# Patient Record
Sex: MALE | Race: WHITE
[De-identification: names, ages, dates, MRNs, and addresses within clinical notes are randomized per-mention and may not be internally consistent; named-entity substitution may affect disease eponyms.]

---

## 2018-08-29 ENCOUNTER — HOSPITAL ENCOUNTER (OUTPATIENT)
Dept: HOSPITAL 92 - ERS | Age: 63
Setting detail: OBSERVATION
LOS: 1 days | Discharge: HOME | End: 2018-08-30
Attending: INTERNAL MEDICINE | Admitting: INTERNAL MEDICINE
Payer: OTHER GOVERNMENT

## 2018-08-29 ENCOUNTER — HOSPITAL ENCOUNTER (EMERGENCY)
Dept: HOSPITAL 9 - MADERS | Age: 63
Discharge: ANOTHER HOSPITAL | End: 2018-08-29
Payer: OTHER GOVERNMENT

## 2018-08-29 VITALS — BODY MASS INDEX: 31.6 KG/M2

## 2018-08-29 DIAGNOSIS — Z79.899: ICD-10-CM

## 2018-08-29 DIAGNOSIS — Z79.4: ICD-10-CM

## 2018-08-29 DIAGNOSIS — Z95.4: ICD-10-CM

## 2018-08-29 DIAGNOSIS — Z95.5: ICD-10-CM

## 2018-08-29 DIAGNOSIS — F17.210: ICD-10-CM

## 2018-08-29 DIAGNOSIS — Z88.2: ICD-10-CM

## 2018-08-29 DIAGNOSIS — E11.9: ICD-10-CM

## 2018-08-29 DIAGNOSIS — E78.5: ICD-10-CM

## 2018-08-29 DIAGNOSIS — I10: Primary | ICD-10-CM

## 2018-08-29 DIAGNOSIS — I25.10: ICD-10-CM

## 2018-08-29 DIAGNOSIS — R07.89: Primary | ICD-10-CM

## 2018-08-29 DIAGNOSIS — I10: ICD-10-CM

## 2018-08-29 DIAGNOSIS — Z88.0: ICD-10-CM

## 2018-08-29 LAB
ALBUMIN SERPL BCG-MCNC: 4.3 G/DL (ref 3.4–4.8)
ALP SERPL-CCNC: 165 U/L (ref 40–150)
ALT SERPL W P-5'-P-CCNC: 19 U/L (ref 8–55)
ANION GAP SERPL CALC-SCNC: 14 MMOL/L (ref 10–20)
AST SERPL-CCNC: 12 U/L (ref 5–34)
BASOPHILS # BLD AUTO: 0.2 THOU/UL (ref 0–0.2)
BASOPHILS NFR BLD AUTO: 1.9 % (ref 0–1)
BILIRUB SERPL-MCNC: 0.5 MG/DL (ref 0.2–1.2)
BUN SERPL-MCNC: 22 MG/DL (ref 8.4–25.7)
CALCIUM SERPL-MCNC: 9.7 MG/DL (ref 7.8–10.44)
CHLORIDE SERPL-SCNC: 106 MMOL/L (ref 98–107)
CK MB SERPL-MCNC: 1.7 NG/ML (ref 0–6.6)
CK SERPL-CCNC: 112 U/L (ref 30–200)
CO2 SERPL-SCNC: 24 MMOL/L (ref 23–31)
CREAT CL PREDICTED SERPL C-G-VRATE: 0 ML/MIN (ref 70–130)
EOSINOPHIL # BLD AUTO: 0.2 THOU/UL (ref 0–0.7)
EOSINOPHIL NFR BLD AUTO: 1.5 % (ref 0–10)
GLOBULIN SER CALC-MCNC: 2.9 G/DL (ref 2.4–3.5)
GLUCOSE SERPL-MCNC: 233 MG/DL (ref 80–115)
HGB BLD-MCNC: 14.9 G/DL (ref 14–18)
LYMPHOCYTES # BLD AUTO: 3.7 THOU/UL (ref 1.2–3.4)
LYMPHOCYTES NFR BLD AUTO: 34.4 % (ref 21–51)
MCH RBC QN AUTO: 31.3 PG (ref 27–31)
MCV RBC AUTO: 89.4 FL (ref 78–98)
MONOCYTES # BLD AUTO: 0.9 THOU/UL (ref 0.11–0.59)
MONOCYTES NFR BLD AUTO: 8.8 % (ref 0–10)
NEUTROPHILS # BLD AUTO: 5.7 THOU/UL (ref 1.4–6.5)
NEUTROPHILS NFR BLD AUTO: 53.5 % (ref 42–75)
PLATELET # BLD AUTO: 198 THOU/UL (ref 130–400)
POTASSIUM SERPL-SCNC: 4.1 MMOL/L (ref 3.5–5.1)
RBC # BLD AUTO: 4.78 MILL/UL (ref 4.7–6.1)
SODIUM SERPL-SCNC: 140 MMOL/L (ref 136–145)
TROPONIN I SERPL DL<=0.01 NG/ML-MCNC: (no result) NG/ML (ref ?–0.03)
WBC # BLD AUTO: 10.7 THOU/UL (ref 4.8–10.8)

## 2018-08-29 PROCEDURE — 80048 BASIC METABOLIC PNL TOTAL CA: CPT

## 2018-08-29 PROCEDURE — 94760 N-INVAS EAR/PLS OXIMETRY 1: CPT

## 2018-08-29 PROCEDURE — 71045 X-RAY EXAM CHEST 1 VIEW: CPT

## 2018-08-29 PROCEDURE — A9500 TC99M SESTAMIBI: HCPCS

## 2018-08-29 PROCEDURE — 80053 COMPREHEN METABOLIC PANEL: CPT

## 2018-08-29 PROCEDURE — 93005 ELECTROCARDIOGRAM TRACING: CPT

## 2018-08-29 PROCEDURE — 85379 FIBRIN DEGRADATION QUANT: CPT

## 2018-08-29 PROCEDURE — 36416 COLLJ CAPILLARY BLOOD SPEC: CPT

## 2018-08-29 PROCEDURE — G0378 HOSPITAL OBSERVATION PER HR: HCPCS

## 2018-08-29 PROCEDURE — 36415 COLL VENOUS BLD VENIPUNCTURE: CPT

## 2018-08-29 PROCEDURE — 84484 ASSAY OF TROPONIN QUANT: CPT

## 2018-08-29 PROCEDURE — 82553 CREATINE MB FRACTION: CPT

## 2018-08-29 PROCEDURE — 96374 THER/PROPH/DIAG INJ IV PUSH: CPT

## 2018-08-29 PROCEDURE — 85025 COMPLETE CBC W/AUTO DIFF WBC: CPT

## 2018-08-29 PROCEDURE — 80061 LIPID PANEL: CPT

## 2018-08-29 PROCEDURE — 82550 ASSAY OF CK (CPK): CPT

## 2018-08-29 PROCEDURE — 83036 HEMOGLOBIN GLYCOSYLATED A1C: CPT

## 2018-08-29 PROCEDURE — 99406 BEHAV CHNG SMOKING 3-10 MIN: CPT

## 2018-08-29 PROCEDURE — 78452 HT MUSCLE IMAGE SPECT MULT: CPT

## 2018-08-29 PROCEDURE — 96361 HYDRATE IV INFUSION ADD-ON: CPT

## 2018-08-29 PROCEDURE — 83880 ASSAY OF NATRIURETIC PEPTIDE: CPT

## 2018-08-29 PROCEDURE — 93017 CV STRESS TEST TRACING ONLY: CPT

## 2018-08-29 RX ADMIN — INSULIN GLARGINE SCH MLS: 100 INJECTION, SOLUTION SUBCUTANEOUS at 22:09

## 2018-08-29 NOTE — RAD
PORTABLE CHEST:

 

Date: 8-29-18 

 

Provided Clinical History: 

Difficulty breathing. 

 

FINDINGS: 

Comparison 12-15-15. Cardiac and mediastinal silhouette is within normal limits. Vascular calcificati
on involves the aortic arch. No focal consolidation, pleural fluid, or pneumothorax apparent. Remote 
healed right sided rib fractures noted. 

 

IMPRESSION: 

No evidence for an acute cardiopulmonary process. 

 

POS: [facility]

## 2018-08-30 VITALS — TEMPERATURE: 97.7 F

## 2018-08-30 VITALS — DIASTOLIC BLOOD PRESSURE: 74 MMHG | SYSTOLIC BLOOD PRESSURE: 163 MMHG

## 2018-08-30 LAB
ANION GAP SERPL CALC-SCNC: 9 MMOL/L (ref 10–20)
BASOPHILS # BLD AUTO: 0.1 THOU/UL (ref 0–0.2)
BASOPHILS NFR BLD AUTO: 0.8 % (ref 0–1)
BUN SERPL-MCNC: 19 MG/DL (ref 8.4–25.7)
CALCIUM SERPL-MCNC: 8.9 MG/DL (ref 7.8–10.44)
CHD RISK SERPL-RTO: 4.2 (ref ?–4.5)
CHLORIDE SERPL-SCNC: 108 MMOL/L (ref 98–107)
CHOLEST SERPL-MCNC: 177 MG/DL
CO2 SERPL-SCNC: 27 MMOL/L (ref 23–31)
CREAT CL PREDICTED SERPL C-G-VRATE: 128 ML/MIN (ref 70–130)
EOSINOPHIL # BLD AUTO: 0.2 THOU/UL (ref 0–0.7)
EOSINOPHIL NFR BLD AUTO: 1.8 % (ref 0–10)
GLUCOSE SERPL-MCNC: 119 MG/DL (ref 80–115)
HDLC SERPL-MCNC: 42 MG/DL
HGB BLD-MCNC: 13.6 G/DL (ref 14–18)
LDLC SERPL CALC-MCNC: 102 MG/DL
LYMPHOCYTES # BLD: 3.6 THOU/UL (ref 1.2–3.4)
LYMPHOCYTES NFR BLD AUTO: 35.4 % (ref 21–51)
MCH RBC QN AUTO: 32.6 PG (ref 27–31)
MCV RBC AUTO: 91.9 FL (ref 78–98)
MONOCYTES # BLD AUTO: 0.9 THOU/UL (ref 0.11–0.59)
MONOCYTES NFR BLD AUTO: 9.1 % (ref 0–10)
NEUTROPHILS # BLD AUTO: 5.3 THOU/UL (ref 1.4–6.5)
NEUTROPHILS NFR BLD AUTO: 52.9 % (ref 42–75)
PLATELET # BLD AUTO: 173 THOU/UL (ref 130–400)
POTASSIUM SERPL-SCNC: 3.5 MMOL/L (ref 3.5–5.1)
RBC # BLD AUTO: 4.18 MILL/UL (ref 4.7–6.1)
SODIUM SERPL-SCNC: 140 MMOL/L (ref 136–145)
TRIGL SERPL-MCNC: 165 MG/DL (ref ?–150)
TROPONIN I SERPL DL<=0.01 NG/ML-MCNC: (no result) NG/ML (ref ?–0.03)
WBC # BLD AUTO: 10 THOU/UL (ref 4.8–10.8)

## 2018-08-30 RX ADMIN — INSULIN GLARGINE SCH: 100 INJECTION, SOLUTION SUBCUTANEOUS at 13:14

## 2018-08-30 NOTE — PDOC.PN
- Subjective


Encounter Start Date: 08/30/18


Encounter Start Time: 09:45


Subjective: no chest pain now


-: is npo for stress test today





- Objective


Resuscitation Status: 


 











Resuscitation Status           FULL:Full Resuscitation














MAR Reviewed: Yes


Vital Signs & Weight: 


 Vital Signs (12 hours)











  Temp Pulse Resp BP BP Pulse Ox


 


 08/30/18 08:45     163/74 H  


 


 08/30/18 08:25  97.7 F  69  15   141/66 H  95


 


 08/30/18 02:22  97.6 F  80  18  163/74 H   95


 


 08/29/18 23:35  98.0 F  73  18   173/76 H  93 L








 Weight











Weight                         214 lb 1 oz














I&O: 


 











 08/29/18 08/30/18 08/31/18





 06:59 06:59 06:59


 


Intake Total  1040 


 


Balance  1040 











Result Diagrams: 


 08/30/18 05:03





 08/30/18 05:03


Additional Labs: 


 Accuchecks











  08/29/18





  20:20


 


POC Glucose  146 H














Phys Exam





- Physical Examination


HEENT: PERRLA, moist MMs


Neck: no JVD, supple


Respiratory: no wheezing, no rales


Cardiovascular: RRR, no significant murmur


Gastrointestinal: soft, non-tender, positive bowel sounds


Musculoskeletal: no edema, pulses present


Neurological: non-focal, moves all 4 limbs


Psychiatric: normal affect, A&O x 3





Dx/Plan


(1) Chest pain


Code(s): R07.9 - CHEST PAIN, UNSPECIFIED   Status: Acute   


Qualifiers: 


   Chest pain type: unspecified   Qualified Code(s): R07.9 - Chest pain, 

unspecified   





(2) DM type 2 (diabetes mellitus, type 2)


Status: Chronic   


Qualifiers: 


   Diabetes mellitus long term insulin use: with long term use   Diabetes 

mellitus complication status: with unspecified complications   Qualified Code(s)

: E11.8 - Type 2 diabetes mellitus with unspecified complications; Z79.4 - Long 

term (current) use of insulin   





(3) Coronary artery disease


Code(s): I25.10 - ATHSCL HEART DISEASE OF NATIVE CORONARY ARTERY W/O ANG PCTRS 

  Status: Chronic   


Qualifiers: 


   Coronary Disease-Associated Artery/Lesion type: native artery   Native vs. 

transplanted heart: native heart   Associated angina: with stable angina   

Qualified Code(s): I25.118 - Atherosclerotic heart disease of native coronary 

artery with other forms of angina pectoris   


Comment: prior h/o stent x2 to RCA, OMx?1   





(4) Hyperlipidemia


Code(s): E78.5 - HYPERLIPIDEMIA, UNSPECIFIED   Status: Chronic   


Qualifiers: 


   Hyperlipidemia type: unspecified   Qualified Code(s): E78.5 - Hyperlipidemia

, unspecified   





(5) Hypertension


Code(s): I10 - ESSENTIAL (PRIMARY) HYPERTENSION   Status: Chronic   


Qualifiers: 


   Hypertension type: essential hypertension   Qualified Code(s): I10 - 

Essential (primary) hypertension   





(6) Tobacco abuse


Code(s): Z72.0 - TOBACCO USE   Status: Chronic   





- Plan


stress test


-: had echo done 1 week back in 's office


-: likely might have venous insuff with pedal edema off and on


-: on asp, lipitor, norvasc, lisinopril and lantus 


-: cardio consult ?noncompliance with on going tob abuse





* .








Review of Systems





- Medications/Allergies


Allergies/Adverse Reactions: 


 Allergies











Allergy/AdvReac Type Severity Reaction Status Date / Time


 


acetaminophen Allergy Intermediate  Verified 05/21/15 00:10


 


ampicillin Allergy Intermediate Rash Verified 05/21/15 00:10


 


codeine Allergy Intermediate Rash Verified 05/21/15 00:10


 


Penicillins Allergy Intermediate Rash Verified 05/21/15 00:10


 


bee stings Allergy   Uncoded 12/15/15 23:11











Medications: 


 Current Medications





Albuterol/Ipratropium (Duoneb)  3 ml NEB QIDPRN PRN


   PRN Reason: SOB &/or Wheezing


Amlodipine Besylate (Norvasc)  5 mg PO DAILY [facility]


   Last Admin: 08/30/18 08:44 Dose:  5 mg


Aspirin (Aspirin)  325 mg PO DAILY [facility]


   Last Admin: 08/30/18 08:45 Dose:  325 mg


Atorvastatin Calcium (Lipitor)  80 mg PO QPM [facility]


   Last Admin: 08/29/18 22:07 Dose:  80 mg


Dextrose/Water (Dextrose 50%)  25 gm SLOW IVP PRN PRN


   PRN Reason: Hypoglycemia


Enoxaparin Sodium (Lovenox)  40 mg SC 0900 [facility]


   Last Admin: 08/30/18 08:45 Dose:  Not Given


Glucagon (Glucagon)  1 mg IM PRN PRN


   PRN Reason: Hypoglycemia


Dextrose/Water (D5w)  1,000 mls @ 0 mls/hr IV .Q0M PRN


   PRN Reason: Hypoglycemia


Insulin Glargine 35 units/ (Miscellaneous Medication)  0.35 mls @ 0 mls/hr SC 

BID [facility]


   Last Admin: 08/29/18 22:09 Dose:  0.35 mls


Insulin Human Lispro (Humalog)  0 units SC .MILD SLIDING SCALE PRN


   PRN Reason: Mild Correctional Scale


Lisinopril (Zestril)  40 mg PO DAILY [facility]


   Last Admin: 08/30/18 08:45 Dose:  40 mg


Nitroglycerin (Nitrostat)  0.4 mg SL Q5MIN PRN


   PRN Reason: Chest Pain


Ondansetron HCl (Zofran)  4 mg IVP Q6H PRN


   PRN Reason: Nausea/Vomiting


Tamsulosin HCl (Flomax)  0.4 mg PO DAILY [facility]


   Last Admin: 08/30/18 08:46 Dose:  0.4 mg


Tramadol HCl (Ultram)  50 mg PO Q6H PRN


   PRN Reason: Moderate Pain (4-6)


   Last Admin: 08/30/18 08:56 Dose:  50 mg

## 2018-08-30 NOTE — HP
PRIMARY CARE PHYSICIAN:  Zaida Villeda M.D.

 

CARDIOLOGIST:  Kemar Zazueta M.D.

 

CODE STATUS:  FULL CODE.

 

TIME OF EVALUATION:  7:35 p.m.

 

CHIEF COMPLAINT:  Chest pain.

 

HISTORY OF PRESENT ILLNESS:  This is a 63 years old male patient with past medical history of medical
 noncompliance, diabetes type 2, GERD, hyperlipidemia, hypertension, came to the hospital after havin
g chest pain, pain has been ongoing for the past 2-3 months, patient reported having the chest tightn
ess, he has been followed with Dr. Zazueta, and was scheduled to have a stress test in the next coming
 days, but he was feeling not well and called Dr. Zazueta's office and he was advised to come here to 
the hospital.  The pain was moderate, 5/10, no clear triggers, no alleviating factors.

 

REVIEW OF SYSTEMS:  Constitutional:  No fever or chills or generalized weakness.  Respiratory:  No co
ugh or sputum production or shortness of breath.  Cardiovascular:  Chest pain, no palpitations, no sh
ortness of breath.  Gastrointestinal:  No nausea, no vomiting, diarrhea or abdominal pain.  CNS:  No 
dizziness, headache or feeling lightheaded.  Genitourinary:  No burning on urination.  Extremities:  
No leg swelling.  All other systems were reviewed and negative except for the findings mentioned abov
e.

 

PAST MEDICAL HISTORY:  As mentioned in the HPI.

 

PAST SURGICAL HISTORY:  History of hernia repair, rib fractures in last March, stents x3 placed 6 mon
ths ago, dislocation of the right knee.

 

PSYCHIATRIC HISTORY:  No previous psychiatric history.

 

SOCIAL HISTORY:  Smokes on a daily basis, 1 pack of cigarettes per day.  Lives at home.  No drug use.


 

FAMILY HISTORY:  The patient denies any significant family history, mom or dad.

 

KNOWN ALLERGIES:  CODEINE SULFATE, PENICILLINS and TYLENOL.

 

REPORTED MEDICATIONS:  Levemir 50 units 2 times a day, lisinopril 40 mg 1 tablet once a day, amlodipi
ne 10 mg orally once a day.

 

PHYSICAL EXAMINATION:

VITAL SIGNS:  Blood pressure 135/104 with heart rate of 71, respiratory rate was 18, temperature 98.5
, pain 4/10, oxygen saturation 97 on room air.

GENERAL APPEARANCE:  Alert, oriented, not in any acute distress.

HEENT:  Eyes:  Normal conjunctiva.  Moist oral mucosa.  Anicteric.

NECK:  No JVD.

RESPIRATORY:  Bilateral air entry.  No rales, no wheezing.  Symmetric expansion.

CARDIOVASCULAR:  Normal rate, regular rhythm.  No murmurs, no gallop, no edema.

ABDOMEN:  Soft, normal bowel sounds.

MUSCULOSKELETAL:  Baseline range of motion and strength.  No tenderness.

SKIN:  Warm and intact.  No pallor, no rash.  No redness.  Peripheral pulses are present.  Capillary 
refill seems to be intact.

NEUROLOGIC:  Baseline sensory.  No evidence of any new focal weakness.  Baseline speech.  Cranial ner
ves seem to be intact.

PSYCHIATRIC:  The patient is in good mood.  No anxiety, oriented, optimal judgment.

 

EKG was discussed with the performing physician from ER, shows a normal sinus rhythm with a rate of 6
6 with no ectopics.  Conduction is normal.  OR interval 190, QT corrected 389.  Chest x-ray was revie
wed.  The patient had no evidence of acute cardiopulmonary disease.

 

LABORATORY DATA:  Reviewed.  The patient has glucose of 146.  Troponin was negative.  White count 10.
7, hemoglobin 14.9, MCV 89.  Sodium 140, potassium 4.1, carbon dioxide 24, anion gap 14, BUN 22, crea
tinine 0.9, GFR 76, glucose 233.  LFTs were normal.

 

ASSESSMENT AND PLAN:  The patient will be placed in the hospital with the following medical problems:


1.  Chest pain, rule out acute coronary syndrome.  The patient has been going on for a few months ___
__ by Dr. Zazueta to do a stress test, will do in the morning, we will consult Dr. Zazueta for further 
recommendations.  Reconcile home medications.

2.  Uncontrolled diabetes, blood sugar elevated, hyperglycemia, we will reconcile home medications, s
liding scale for optimal control.

3.  Uncontrolled hypertension with systolic blood pressure 146, reconcile home medications, adjust tr
eatment as needed.

4.  Deep venous thrombosis prophylaxis.

5.  Diastolic dysfunction, this is chronic, seen on the previous echo that was reviewed from 08/2014.
  Reconcile home medications, we will restart diuresis.  The patient was _____.  Further adjustment a
nd management as per Dr. Zazueta.

## 2018-08-30 NOTE — NM
NUCLEAR MEDICINE CARDIAC STRESS WITH EJECTION FRACTION:

 

History: Chest pain. 

 

Comparison: Cardiac stress test, 2012. 

 

Technique: Stress and rest imaging after intravenous administration of 28.7 and 9 mCi Technetium 99M 
Sestamibi, respectively.

 

FINDINGS: 

No scar is seen. Adequate left ventricular uptake of radiotracer. 

 

The calculated ejection fraction is 45%. 

 

IMPRESSION: 

1. No evidence of scar or ischemia. 

2. Ejection fraction 45%.

 

POS: [facility]

## 2018-09-01 NOTE — EKG
Test Reason : 

Blood Pressure : ***/*** mmHG

Vent. Rate : 066 BPM     Atrial Rate : 066 BPM

   P-R Int : 190 ms          QRS Dur : 102 ms

    QT Int : 372 ms       P-R-T Axes : 071 094 067 degrees

   QTc Int : 389 ms

 

Normal sinus rhythm

Rightward axis

Borderline ECG

 

Confirmed by GABRIELLE BAIN DO ([id number]),  TAM ADAMS ([id number]) on 9/1/2018 8:23:27 PM

 

Referred By:             Confirmed By:GABRIELLE BAIN DO

## 2018-09-21 ENCOUNTER — HOSPITAL ENCOUNTER (OUTPATIENT)
Dept: HOSPITAL 92 - CCL | Age: 63
Discharge: HOME | End: 2018-09-21
Attending: INTERNAL MEDICINE
Payer: OTHER GOVERNMENT

## 2018-09-21 VITALS — BODY MASS INDEX: 31.3 KG/M2

## 2018-09-21 DIAGNOSIS — E78.00: ICD-10-CM

## 2018-09-21 DIAGNOSIS — Z91.030: ICD-10-CM

## 2018-09-21 DIAGNOSIS — E11.9: ICD-10-CM

## 2018-09-21 DIAGNOSIS — Z88.8: ICD-10-CM

## 2018-09-21 DIAGNOSIS — I25.2: ICD-10-CM

## 2018-09-21 DIAGNOSIS — Z79.4: ICD-10-CM

## 2018-09-21 DIAGNOSIS — I50.9: ICD-10-CM

## 2018-09-21 DIAGNOSIS — Z95.5: ICD-10-CM

## 2018-09-21 DIAGNOSIS — Z79.82: ICD-10-CM

## 2018-09-21 DIAGNOSIS — Z91.038: ICD-10-CM

## 2018-09-21 DIAGNOSIS — Z88.0: ICD-10-CM

## 2018-09-21 DIAGNOSIS — I11.0: ICD-10-CM

## 2018-09-21 DIAGNOSIS — I25.110: Primary | ICD-10-CM

## 2018-09-21 DIAGNOSIS — K21.9: ICD-10-CM

## 2018-09-21 DIAGNOSIS — F17.210: ICD-10-CM

## 2018-09-21 DIAGNOSIS — Z79.899: ICD-10-CM

## 2018-09-21 LAB
ALBUMIN SERPL BCG-MCNC: 4.1 G/DL (ref 3.4–4.8)
ALP SERPL-CCNC: 147 U/L (ref 40–150)
ALT SERPL W P-5'-P-CCNC: 16 U/L (ref 8–55)
ANION GAP SERPL CALC-SCNC: 12 MMOL/L (ref 10–20)
APTT PPP: 26.1 SEC (ref 22.9–36.1)
AST SERPL-CCNC: 13 U/L (ref 5–34)
BASOPHILS # BLD AUTO: 0.1 THOU/UL (ref 0–0.2)
BASOPHILS NFR BLD AUTO: 1.4 % (ref 0–1)
BILIRUB SERPL-MCNC: 0.7 MG/DL (ref 0.2–1.2)
BUN SERPL-MCNC: 24 MG/DL (ref 8.4–25.7)
CALCIUM SERPL-MCNC: 9.4 MG/DL (ref 7.8–10.44)
CHD RISK SERPL-RTO: 4.5 (ref ?–4.5)
CHLORIDE SERPL-SCNC: 108 MMOL/L (ref 98–107)
CHOLEST SERPL-MCNC: 201 MG/DL
CO2 SERPL-SCNC: 23 MMOL/L (ref 23–31)
CREAT CL PREDICTED SERPL C-G-VRATE: 121 ML/MIN (ref 70–130)
EOSINOPHIL # BLD AUTO: 0.2 THOU/UL (ref 0–0.7)
EOSINOPHIL NFR BLD AUTO: 2.4 % (ref 0–10)
GLOBULIN SER CALC-MCNC: 2.9 G/DL (ref 2.4–3.5)
GLUCOSE SERPL-MCNC: 98 MG/DL (ref 80–115)
HDLC SERPL-MCNC: 45 MG/DL
HGB BLD-MCNC: 14.7 G/DL (ref 14–18)
INR PPP: 1
LDLC SERPL CALC-MCNC: 125 MG/DL
LYMPHOCYTES # BLD: 3.5 THOU/UL (ref 1.2–3.4)
LYMPHOCYTES NFR BLD AUTO: 35.6 % (ref 21–51)
MCH RBC QN AUTO: 30.4 PG (ref 27–31)
MCV RBC AUTO: 92.9 FL (ref 78–98)
MONOCYTES # BLD AUTO: 1 THOU/UL (ref 0.11–0.59)
MONOCYTES NFR BLD AUTO: 9.9 % (ref 0–10)
NEUTROPHILS # BLD AUTO: 5 THOU/UL (ref 1.4–6.5)
NEUTROPHILS NFR BLD AUTO: 50.7 % (ref 42–75)
PLATELET # BLD AUTO: 226 THOU/UL (ref 130–400)
POTASSIUM SERPL-SCNC: 3.9 MMOL/L (ref 3.5–5.1)
PROTHROMBIN TIME: 12.8 SEC (ref 12–14.7)
RBC # BLD AUTO: 4.84 MILL/UL (ref 4.7–6.1)
SODIUM SERPL-SCNC: 139 MMOL/L (ref 136–145)
TRIGL SERPL-MCNC: 155 MG/DL (ref ?–150)
WBC # BLD AUTO: 9.8 THOU/UL (ref 4.8–10.8)

## 2018-09-21 PROCEDURE — 4A023N7 MEASUREMENT OF CARDIAC SAMPLING AND PRESSURE, LEFT HEART, PERCUTANEOUS APPROACH: ICD-10-PCS | Performed by: INTERNAL MEDICINE

## 2018-09-21 PROCEDURE — 93458 L HRT ARTERY/VENTRICLE ANGIO: CPT

## 2018-09-21 PROCEDURE — C1769 GUIDE WIRE: HCPCS

## 2018-09-21 PROCEDURE — 36415 COLL VENOUS BLD VENIPUNCTURE: CPT

## 2018-09-21 PROCEDURE — 99152 MOD SED SAME PHYS/QHP 5/>YRS: CPT

## 2018-09-21 PROCEDURE — 80053 COMPREHEN METABOLIC PANEL: CPT

## 2018-09-21 PROCEDURE — 85730 THROMBOPLASTIN TIME PARTIAL: CPT

## 2018-09-21 PROCEDURE — 85025 COMPLETE CBC W/AUTO DIFF WBC: CPT

## 2018-09-21 PROCEDURE — B2111ZZ FLUOROSCOPY OF MULTIPLE CORONARY ARTERIES USING LOW OSMOLAR CONTRAST: ICD-10-PCS | Performed by: INTERNAL MEDICINE

## 2018-09-21 PROCEDURE — 80061 LIPID PANEL: CPT

## 2018-09-21 PROCEDURE — 85610 PROTHROMBIN TIME: CPT

## 2019-02-20 ENCOUNTER — HOSPITAL ENCOUNTER (OUTPATIENT)
Dept: HOSPITAL 92 - BICMRI | Age: 64
Discharge: HOME | End: 2019-02-20
Attending: ORTHOPAEDIC SURGERY
Payer: OTHER GOVERNMENT

## 2019-02-20 DIAGNOSIS — M75.101: Primary | ICD-10-CM

## 2019-02-20 DIAGNOSIS — M19.011: ICD-10-CM

## 2019-02-20 NOTE — MRI
MRI OF THE RIGHT SHOULDER:

 

Date:  02/20/19 

 

PROVIDED CLINICAL HISTORY:   

Right shoulder pain. 

 

FINDINGS:

There is a high grade partial thickness undersurface tear involving the distal supraspinatus tendon f
ibers at the footplate. Superimposed upon this at the region of the distal conjoined tendon is a full
 thickness, partial width tear, commencing about 12 mm proximal to the footplate and associated with 
about 6 mm of retraction. 

 

The components of the rotator cuff appear otherwise intact. The long head biceps tendon appears intac
t and normally located. 

 

The glenoid labrum and glenohumeral articular cartilage are suboptimally evaluated in the absence of 
joint distention, but appear grossly normal. The amount of fluid within the glenohumeral joint is phy
siologic. There is greater than physiologic subacromial/subdeltoid bursal fluid. 

 

Acromioclavicular joint and osteoarthrosis is noted with mild mass effect upon the subjacent supraspi
natus. Rotator cuff muscular volume appears preserved. No focal concerning regional marrow or muscula
r signal abnormality is evident. 

 

IMPRESSION: 

1.  Tears of the supraspinatus and distal conjoined tendons as described above. 

 

2.  Acromioclavicular joint osteoarthritis. 

 

 

POS: [facility]

## 2020-03-16 ENCOUNTER — HOSPITAL ENCOUNTER (EMERGENCY)
Dept: HOSPITAL 9 - MADERS | Age: 65
Discharge: LEFT BEFORE DISCHARGE | End: 2020-03-16
Payer: OTHER GOVERNMENT

## 2020-03-16 DIAGNOSIS — R11.0: ICD-10-CM

## 2020-03-16 DIAGNOSIS — K21.9: ICD-10-CM

## 2020-03-16 DIAGNOSIS — R06.00: ICD-10-CM

## 2020-03-16 DIAGNOSIS — H53.8: ICD-10-CM

## 2020-03-16 DIAGNOSIS — R53.83: ICD-10-CM

## 2020-03-16 DIAGNOSIS — J44.9: ICD-10-CM

## 2020-03-16 DIAGNOSIS — F17.210: ICD-10-CM

## 2020-03-16 DIAGNOSIS — I20.9: ICD-10-CM

## 2020-03-16 DIAGNOSIS — Z79.4: ICD-10-CM

## 2020-03-16 DIAGNOSIS — E78.00: ICD-10-CM

## 2020-03-16 DIAGNOSIS — E11.9: ICD-10-CM

## 2020-03-16 DIAGNOSIS — I11.0: ICD-10-CM

## 2020-03-16 DIAGNOSIS — I25.2: ICD-10-CM

## 2020-03-16 DIAGNOSIS — R07.9: Primary | ICD-10-CM

## 2020-03-16 DIAGNOSIS — I50.9: ICD-10-CM

## 2020-03-16 DIAGNOSIS — E78.5: ICD-10-CM

## 2020-03-16 LAB
ALBUMIN SERPL BCG-MCNC: 3.9 G/DL (ref 3.4–4.8)
ALP SERPL-CCNC: 164 U/L (ref 40–110)
ALT SERPL W P-5'-P-CCNC: 18 U/L (ref 8–55)
ANION GAP SERPL CALC-SCNC: 17 MMOL/L (ref 10–20)
APTT PPP: 26 SEC (ref 22.9–36.1)
AST SERPL-CCNC: 12 U/L (ref 5–34)
BASOPHILS # BLD AUTO: 0.1 THOU/UL (ref 0–0.2)
BASOPHILS NFR BLD AUTO: 1.2 % (ref 0–1)
BILIRUB SERPL-MCNC: 0.4 MG/DL (ref 0.2–1.2)
BUN SERPL-MCNC: 16 MG/DL (ref 8.4–25.7)
CALCIUM SERPL-MCNC: 8.7 MG/DL (ref 7.8–10.44)
CHLORIDE SERPL-SCNC: 107 MMOL/L (ref 98–107)
CO2 SERPL-SCNC: 19 MMOL/L (ref 23–31)
CREAT CL PREDICTED SERPL C-G-VRATE: 0 ML/MIN (ref 70–130)
D DIMER PPP FEU-MCNC: 0.43 *MCG/ML (ref 0.27–0.43)
EOSINOPHIL # BLD AUTO: 0.2 THOU/UL (ref 0–0.7)
EOSINOPHIL NFR BLD AUTO: 1.4 % (ref 0–10)
GLOBULIN SER CALC-MCNC: 2.9 G/DL (ref 2.4–3.5)
GLUCOSE SERPL-MCNC: 245 MG/DL (ref 80–115)
HGB BLD-MCNC: 14.8 G/DL (ref 14–18)
INR PPP: 0.9
LIPASE SERPL-CCNC: 37 U/L (ref 8–78)
LYMPHOCYTES # BLD AUTO: 3.5 THOU/UL (ref 1.2–3.4)
LYMPHOCYTES NFR BLD AUTO: 30.1 % (ref 21–51)
MCH RBC QN AUTO: 29.9 PG (ref 27–31)
MCV RBC AUTO: 90.8 FL (ref 78–98)
MONOCYTES # BLD AUTO: 0.7 THOU/UL (ref 0.11–0.59)
MONOCYTES NFR BLD AUTO: 6 % (ref 0–10)
NEUTROPHILS # BLD AUTO: 7.1 THOU/UL (ref 1.4–6.5)
NEUTROPHILS NFR BLD AUTO: 61.3 % (ref 42–75)
PLATELET # BLD AUTO: 249 THOU/UL (ref 130–400)
POTASSIUM SERPL-SCNC: 4 MMOL/L (ref 3.5–5.1)
PROTHROMBIN TIME: 12.5 SEC (ref 12–14.7)
RBC # BLD AUTO: 4.96 MILL/UL (ref 4.7–6.1)
SODIUM SERPL-SCNC: 139 MMOL/L (ref 136–145)
WBC # BLD AUTO: 11.6 THOU/UL (ref 4.8–10.8)

## 2020-03-16 PROCEDURE — 85730 THROMBOPLASTIN TIME PARTIAL: CPT

## 2020-03-16 PROCEDURE — 93005 ELECTROCARDIOGRAM TRACING: CPT

## 2020-03-16 PROCEDURE — 85610 PROTHROMBIN TIME: CPT

## 2020-03-16 PROCEDURE — 83880 ASSAY OF NATRIURETIC PEPTIDE: CPT

## 2020-03-16 PROCEDURE — 83690 ASSAY OF LIPASE: CPT

## 2020-03-16 PROCEDURE — 70450 CT HEAD/BRAIN W/O DYE: CPT

## 2020-03-16 PROCEDURE — 85025 COMPLETE CBC W/AUTO DIFF WBC: CPT

## 2020-03-16 PROCEDURE — 84484 ASSAY OF TROPONIN QUANT: CPT

## 2020-03-16 PROCEDURE — 80053 COMPREHEN METABOLIC PANEL: CPT

## 2020-03-16 PROCEDURE — 85379 FIBRIN DEGRADATION QUANT: CPT

## 2020-03-16 PROCEDURE — 71045 X-RAY EXAM CHEST 1 VIEW: CPT

## 2020-03-16 NOTE — CT
CT BRAIN WITHOUT CONTRAST:



HISTORY: Vision change, generalized weakness



COMPARISON: 12/16/2015



FINDINGS:

No evidence of acute infarct, hemorrhage, midline shift or abnormal extra-axial fluid collections is 
seen. The ventricular size is appropriate and the basilar cisterns are patent. The bony calvarium

is intact. The visualized paranasal sinuses and mastoid air cells are well aerated.



IMPRESSION: No CT evidence of acute intracranial process.



Reported By: Vic Velez 

Electronically Signed:  3/16/2020 3:24 PM

## 2020-03-16 NOTE — RAD
Chest one view



HISTORY: Dyspnea. Chest pain.



COMPARISON: 8/29/2018.



FINDINGS: Cardiac silhouette is magnified by projection. Pulmonary vasculature is unremarkable. Media
stinum is midline. No confluent airspace consolidation or evidence of pneumothorax. Old right

posterolateral upper to mid rib fractures again demonstrated.



Cardiac monitor leads overlie the chest.







IMPRESSION: No active cardiopulmonary abnormalities are demonstrated.



Reported By: VERN Helton 

Electronically Signed:  3/16/2020 3:06 PM

## 2021-03-05 ENCOUNTER — HOSPITAL ENCOUNTER (OUTPATIENT)
Dept: HOSPITAL 9 - MADRAD | Age: 66
Discharge: HOME | End: 2021-03-05
Attending: NURSE PRACTITIONER
Payer: OTHER GOVERNMENT

## 2021-03-05 DIAGNOSIS — M19.072: ICD-10-CM

## 2021-03-05 DIAGNOSIS — M79.672: ICD-10-CM

## 2021-03-05 DIAGNOSIS — S90.32XA: Primary | ICD-10-CM

## 2021-05-01 ENCOUNTER — HOSPITAL ENCOUNTER (OUTPATIENT)
Dept: HOSPITAL 92 - ERS | Age: 66
Setting detail: OBSERVATION
Discharge: HOME | End: 2021-05-01
Attending: STUDENT IN AN ORGANIZED HEALTH CARE EDUCATION/TRAINING PROGRAM | Admitting: STUDENT IN AN ORGANIZED HEALTH CARE EDUCATION/TRAINING PROGRAM
Payer: OTHER GOVERNMENT

## 2021-05-01 ENCOUNTER — HOSPITAL ENCOUNTER (EMERGENCY)
Dept: HOSPITAL 9 - MADERS | Age: 66
Discharge: ANOTHER HOSPITAL | End: 2021-05-01
Payer: OTHER GOVERNMENT

## 2021-05-01 VITALS — SYSTOLIC BLOOD PRESSURE: 135 MMHG | TEMPERATURE: 97.8 F | DIASTOLIC BLOOD PRESSURE: 65 MMHG

## 2021-05-01 VITALS — BODY MASS INDEX: 32.5 KG/M2

## 2021-05-01 DIAGNOSIS — I25.10: ICD-10-CM

## 2021-05-01 DIAGNOSIS — E78.5: ICD-10-CM

## 2021-05-01 DIAGNOSIS — F17.210: ICD-10-CM

## 2021-05-01 DIAGNOSIS — Z79.4: ICD-10-CM

## 2021-05-01 DIAGNOSIS — E11.65: ICD-10-CM

## 2021-05-01 DIAGNOSIS — I50.9: ICD-10-CM

## 2021-05-01 DIAGNOSIS — Z79.82: ICD-10-CM

## 2021-05-01 DIAGNOSIS — J44.9: ICD-10-CM

## 2021-05-01 DIAGNOSIS — E11.9: ICD-10-CM

## 2021-05-01 DIAGNOSIS — Z79.899: ICD-10-CM

## 2021-05-01 DIAGNOSIS — E66.9: ICD-10-CM

## 2021-05-01 DIAGNOSIS — Z88.0: ICD-10-CM

## 2021-05-01 DIAGNOSIS — K21.9: ICD-10-CM

## 2021-05-01 DIAGNOSIS — I25.119: ICD-10-CM

## 2021-05-01 DIAGNOSIS — E78.00: ICD-10-CM

## 2021-05-01 DIAGNOSIS — I11.0: ICD-10-CM

## 2021-05-01 DIAGNOSIS — Z88.5: ICD-10-CM

## 2021-05-01 DIAGNOSIS — Z91.030: ICD-10-CM

## 2021-05-01 DIAGNOSIS — R07.89: Primary | ICD-10-CM

## 2021-05-01 DIAGNOSIS — Z71.6: ICD-10-CM

## 2021-05-01 DIAGNOSIS — Z88.6: ICD-10-CM

## 2021-05-01 DIAGNOSIS — R07.9: Primary | ICD-10-CM

## 2021-05-01 LAB
ALBUMIN SERPL BCG-MCNC: 3.9 G/DL (ref 3.4–4.8)
ALP SERPL-CCNC: 160 U/L (ref 40–110)
ALT SERPL W P-5'-P-CCNC: 20 U/L (ref 8–55)
ANION GAP SERPL CALC-SCNC: 17 MMOL/L (ref 10–20)
APTT PPP: 25.5 SEC (ref 22.9–36.1)
AST SERPL-CCNC: 15 U/L (ref 5–34)
BILIRUB SERPL-MCNC: 0.5 MG/DL (ref 0.2–1.2)
BUN SERPL-MCNC: 19 MG/DL (ref 8.4–25.7)
CALCIUM SERPL-MCNC: 8.9 MG/DL (ref 7.8–10.44)
CHLORIDE SERPL-SCNC: 105 MMOL/L (ref 98–107)
CO2 SERPL-SCNC: 21 MMOL/L (ref 23–31)
CREAT CL PREDICTED SERPL C-G-VRATE: 0 ML/MIN (ref 70–130)
GLOBULIN SER CALC-MCNC: 3.3 G/DL (ref 2.4–3.5)
GLUCOSE SERPL-MCNC: 320 MG/DL (ref 80–115)
HGB BLD-MCNC: 14.7 G/DL (ref 14–18)
INR PPP: 0.9
MCH RBC QN AUTO: 30.4 PG (ref 27–31)
MCV RBC AUTO: 93 FL (ref 78–98)
MDIFF COMPLETE?: YES
PLATELET # BLD AUTO: 236 THOU/UL (ref 130–400)
POTASSIUM SERPL-SCNC: 4 MMOL/L (ref 3.5–5.1)
PROTHROMBIN TIME: 12 SEC (ref 12–14.7)
RBC # BLD AUTO: 4.82 MILL/UL (ref 4.7–6.1)
SODIUM SERPL-SCNC: 139 MMOL/L (ref 136–145)
TROPONIN I SERPL DL<=0.01 NG/ML-MCNC: (no result) NG/ML (ref ?–0.03)
TROPONIN I SERPL DL<=0.01 NG/ML-MCNC: 0.01 NG/ML (ref ?–0.03)
WBC # BLD AUTO: 11.9 THOU/UL (ref 4.8–10.8)

## 2021-05-01 PROCEDURE — G0378 HOSPITAL OBSERVATION PER HR: HCPCS

## 2021-05-01 PROCEDURE — 85610 PROTHROMBIN TIME: CPT

## 2021-05-01 PROCEDURE — 36415 COLL VENOUS BLD VENIPUNCTURE: CPT

## 2021-05-01 PROCEDURE — 71045 X-RAY EXAM CHEST 1 VIEW: CPT

## 2021-05-01 PROCEDURE — 36416 COLLJ CAPILLARY BLOOD SPEC: CPT

## 2021-05-01 PROCEDURE — 99406 BEHAV CHNG SMOKING 3-10 MIN: CPT

## 2021-05-01 PROCEDURE — 80053 COMPREHEN METABOLIC PANEL: CPT

## 2021-05-01 PROCEDURE — 83880 ASSAY OF NATRIURETIC PEPTIDE: CPT

## 2021-05-01 PROCEDURE — 96374 THER/PROPH/DIAG INJ IV PUSH: CPT

## 2021-05-01 PROCEDURE — 74174 CTA ABD&PLVS W/CONTRAST: CPT

## 2021-05-01 PROCEDURE — 93017 CV STRESS TEST TRACING ONLY: CPT

## 2021-05-01 PROCEDURE — 71275 CT ANGIOGRAPHY CHEST: CPT

## 2021-05-01 PROCEDURE — 85730 THROMBOPLASTIN TIME PARTIAL: CPT

## 2021-05-01 PROCEDURE — A9500 TC99M SESTAMIBI: HCPCS

## 2021-05-01 PROCEDURE — 99285 EMERGENCY DEPT VISIT HI MDM: CPT

## 2021-05-01 PROCEDURE — 93005 ELECTROCARDIOGRAM TRACING: CPT

## 2021-05-01 PROCEDURE — 84484 ASSAY OF TROPONIN QUANT: CPT

## 2021-05-01 PROCEDURE — 78452 HT MUSCLE IMAGE SPECT MULT: CPT

## 2021-05-01 PROCEDURE — 96376 TX/PRO/DX INJ SAME DRUG ADON: CPT

## 2021-05-01 PROCEDURE — 85025 COMPLETE CBC W/AUTO DIFF WBC: CPT

## 2021-12-14 ENCOUNTER — HOSPITAL ENCOUNTER (OUTPATIENT)
Dept: HOSPITAL 92 - SCSMRI | Age: 66
Discharge: HOME | End: 2021-12-14
Payer: OTHER GOVERNMENT

## 2021-12-14 DIAGNOSIS — M50.122: ICD-10-CM

## 2021-12-14 DIAGNOSIS — M47.22: Primary | ICD-10-CM

## 2021-12-14 DIAGNOSIS — M48.02: ICD-10-CM

## 2021-12-14 PROCEDURE — 72141 MRI NECK SPINE W/O DYE: CPT

## 2022-01-01 ENCOUNTER — HOSPITAL ENCOUNTER (EMERGENCY)
Dept: HOSPITAL 9 - MADERS | Age: 67
Discharge: HOME | End: 2022-01-01
Payer: OTHER GOVERNMENT

## 2022-01-01 DIAGNOSIS — I25.10: ICD-10-CM

## 2022-01-01 DIAGNOSIS — I25.2: ICD-10-CM

## 2022-01-01 DIAGNOSIS — E78.00: ICD-10-CM

## 2022-01-01 DIAGNOSIS — M54.12: ICD-10-CM

## 2022-01-01 DIAGNOSIS — S39.011A: Primary | ICD-10-CM

## 2022-01-01 DIAGNOSIS — K21.9: ICD-10-CM

## 2022-01-01 DIAGNOSIS — I50.9: ICD-10-CM

## 2022-01-01 DIAGNOSIS — E78.5: ICD-10-CM

## 2022-01-01 DIAGNOSIS — Z79.899: ICD-10-CM

## 2022-01-01 DIAGNOSIS — Z79.4: ICD-10-CM

## 2022-01-01 DIAGNOSIS — I11.0: ICD-10-CM

## 2022-01-01 DIAGNOSIS — E11.9: ICD-10-CM

## 2022-01-01 DIAGNOSIS — J44.9: ICD-10-CM

## 2022-01-01 DIAGNOSIS — F17.210: ICD-10-CM
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
## 2022-01-01 LAB
ALBUMIN SERPL BCG-MCNC: 3.7 G/DL (ref 3.4–4.8)
ALP SERPL-CCNC: 136 U/L (ref 40–110)
ALT SERPL W P-5'-P-CCNC: 16 U/L (ref 8–55)
AMYLASE SERPL-CCNC: 62 U/L (ref 25–125)
ANION GAP SERPL CALC-SCNC: 14 MMOL/L (ref 10–20)
AST SERPL-CCNC: 13 U/L (ref 5–34)
BACTERIA UR QL AUTO: (no result) HPF
BASOPHILS # BLD AUTO: 0.2 THOU/UL (ref 0–0.2)
BASOPHILS NFR BLD AUTO: 1.7 % (ref 0–1)
BILIRUB SERPL-MCNC: 0.5 MG/DL (ref 0.2–1.2)
BUN SERPL-MCNC: 26 MG/DL (ref 8.4–25.7)
CALCIUM SERPL-MCNC: 9.6 MG/DL (ref 7.8–10.44)
CHLORIDE SERPL-SCNC: 108 MMOL/L (ref 98–107)
CK SERPL-CCNC: 206 U/L (ref 30–200)
CO2 SERPL-SCNC: 24 MMOL/L (ref 23–31)
CREAT CL PREDICTED SERPL C-G-VRATE: 0 ML/MIN (ref 70–130)
CRP SERPL-MCNC: 0.63 MG/DL
EOSINOPHIL # BLD AUTO: 0.1 THOU/UL (ref 0–0.7)
EOSINOPHIL NFR BLD AUTO: 1.3 % (ref 0–10)
GLOBULIN SER CALC-MCNC: 3 G/DL (ref 2.4–3.5)
GLUCOSE SERPL-MCNC: 249 MG/DL (ref 80–115)
GLUCOSE UR STRIP-MCNC: 100 MG/DL
HGB BLD-MCNC: 13.3 G/DL (ref 14–18)
LIPASE SERPL-CCNC: 42 U/L (ref 8–78)
LYMPHOCYTES # BLD AUTO: 3.1 THOU/UL (ref 1.2–3.4)
LYMPHOCYTES NFR BLD AUTO: 27.6 % (ref 21–51)
MCH RBC QN AUTO: 31 PG (ref 27–31)
MCV RBC AUTO: 90.1 FL (ref 78–98)
MONOCYTES # BLD AUTO: 0.9 THOU/UL (ref 0.11–0.59)
MONOCYTES NFR BLD AUTO: 8.2 % (ref 0–10)
NEUTROPHILS # BLD AUTO: 6.9 THOU/UL (ref 1.4–6.5)
NEUTROPHILS NFR BLD AUTO: 61.2 % (ref 42–75)
PLATELET # BLD AUTO: 257 THOU/UL (ref 130–400)
POTASSIUM SERPL-SCNC: 3.8 MMOL/L (ref 3.5–5.1)
PROT UR STRIP.AUTO-MCNC: (no result) MG/DL
RBC # BLD AUTO: 4.29 MILL/UL (ref 4.7–6.1)
RBC UR QL AUTO: (no result) HPF (ref 0–3)
SODIUM SERPL-SCNC: 142 MMOL/L (ref 136–145)
SP GR UR STRIP: 1.02 (ref 1–1.03)
WBC # BLD AUTO: 11.3 THOU/UL (ref 4.8–10.8)

## 2022-01-01 PROCEDURE — 83880 ASSAY OF NATRIURETIC PEPTIDE: CPT

## 2022-01-01 PROCEDURE — 85379 FIBRIN DEGRADATION QUANT: CPT

## 2022-01-01 PROCEDURE — 74177 CT ABD & PELVIS W/CONTRAST: CPT

## 2022-01-01 PROCEDURE — 86140 C-REACTIVE PROTEIN: CPT

## 2022-01-01 PROCEDURE — 83690 ASSAY OF LIPASE: CPT

## 2022-01-01 PROCEDURE — 71045 X-RAY EXAM CHEST 1 VIEW: CPT

## 2022-01-01 PROCEDURE — 80053 COMPREHEN METABOLIC PANEL: CPT

## 2022-01-01 PROCEDURE — 82550 ASSAY OF CK (CPK): CPT

## 2022-01-01 PROCEDURE — 81003 URINALYSIS AUTO W/O SCOPE: CPT

## 2022-01-01 PROCEDURE — 96375 TX/PRO/DX INJ NEW DRUG ADDON: CPT

## 2022-01-01 PROCEDURE — 71275 CT ANGIOGRAPHY CHEST: CPT

## 2022-01-01 PROCEDURE — 96374 THER/PROPH/DIAG INJ IV PUSH: CPT

## 2022-01-01 PROCEDURE — 82150 ASSAY OF AMYLASE: CPT

## 2022-01-01 PROCEDURE — 85025 COMPLETE CBC W/AUTO DIFF WBC: CPT

## 2022-01-01 PROCEDURE — 93005 ELECTROCARDIOGRAM TRACING: CPT

## 2022-01-01 PROCEDURE — 84484 ASSAY OF TROPONIN QUANT: CPT

## 2022-01-01 PROCEDURE — 81015 MICROSCOPIC EXAM OF URINE: CPT

## 2022-04-25 NOTE — DIS
DATE OF ADMISSION:  08/29/2018

 

DATE OF DISCHARGE:  08/30/2018

 

DISCHARGE DISPOSITION:  To home.

 

PRIMARY DISCHARGE DIAGNOSIS:  Chest pain off and on, is noncardiac.

 

SECONDARY DISCHARGE DIAGNOSES:  Prior history of coronary artery disease with stenting; diabetes kathe
itus, type 2; hypertension; dyslipidemia; tobacco abuse.

 

PROCEDURES DONE DURING HOSPITALIZATION:  The patient had chest x-ray done which showed no acute cardi
opulmonary abnormalities.  Nuclear stress test done showed no reversible ischemia or scar.  H&H 13 an
d 38, platelet count is 173.  Three sets of troponin are negative.  CK-MB is 1.7.  BNP was 28, BUN 22
, creatinine 0.9.  Hemoglobin A1c 8.2, total cholesterol 177, triglycerides 165, , HDL 42.

 

DISCHARGE MEDICATIONS:  Levemir 50 units subcu twice daily, lisinopril 40 mg p.o. daily, Flomax 0.4 m
g p.o. daily, Norvasc 5 mg p.o. daily, aspirin 325 mg p.o. daily, Combivent inhaler 2 puffs 4 times d
aily p.r.n.

 

ALLERGIES:  Allergic to TYLENOL, AMPICILLIN, CODEINE, PENICILLIN, and BEE STING.

 

DISCHARGE PLAN:  The patient to follow up with primary care physician in 1 week and Dr. Zazueta as adv
ised.

 

BRIEF COURSE DURING HOSPITALIZATION:  The patient initially got admitted on 08/29/2018 with complaint
s of off and on chest pain.  He was essentially placed under observation on telemetry in view of stro
ng history of coronary artery disease with prior stent.  Three sets of troponin were negative.  The p
atjuan has had nuclear stress test done which showed no scar or reversible ischemia.  I have discusse
d his findings with Dr. Zazueta and has been cleared for discharge now.  Please see a face-to-face doc
umalvaro on [facility] for the day of discharge.
25-Apr-2022 21:54

## 2022-07-13 ENCOUNTER — HOSPITAL ENCOUNTER (EMERGENCY)
Dept: HOSPITAL 9 - MADERS | Age: 67
Discharge: LEFT BEFORE DISCHARGE | End: 2022-07-13
Payer: OTHER GOVERNMENT

## 2022-07-13 DIAGNOSIS — I25.2: ICD-10-CM

## 2022-07-13 DIAGNOSIS — E11.9: ICD-10-CM

## 2022-07-13 DIAGNOSIS — Z79.899: ICD-10-CM

## 2022-07-13 DIAGNOSIS — J44.9: ICD-10-CM

## 2022-07-13 DIAGNOSIS — I11.0: ICD-10-CM

## 2022-07-13 DIAGNOSIS — I50.9: ICD-10-CM

## 2022-07-13 DIAGNOSIS — E78.00: ICD-10-CM

## 2022-07-13 DIAGNOSIS — F17.210: ICD-10-CM

## 2022-07-13 DIAGNOSIS — R27.0: ICD-10-CM

## 2022-07-13 DIAGNOSIS — I25.110: Primary | ICD-10-CM

## 2022-07-13 LAB
ALBUMIN SERPL BCG-MCNC: 4 G/DL (ref 3.4–4.8)
ALP SERPL-CCNC: 156 U/L (ref 40–110)
ALT SERPL W P-5'-P-CCNC: 19 U/L (ref 8–55)
ANION GAP SERPL CALC-SCNC: 16 MMOL/L (ref 10–20)
AST SERPL-CCNC: 14 U/L (ref 5–34)
BILIRUB SERPL-MCNC: 0.5 MG/DL (ref 0.2–1.2)
BUN SERPL-MCNC: 17 MG/DL (ref 8.4–25.7)
CALCIUM SERPL-MCNC: 8.9 MG/DL (ref 7.8–10.44)
CHLORIDE SERPL-SCNC: 108 MMOL/L (ref 98–107)
CO2 SERPL-SCNC: 21 MMOL/L (ref 23–31)
CREAT CL PREDICTED SERPL C-G-VRATE: 0 ML/MIN (ref 70–130)
GLOBULIN SER CALC-MCNC: 2.6 G/DL (ref 2.4–3.5)
GLUCOSE SERPL-MCNC: 193 MG/DL (ref 80–115)
HGB BLD-MCNC: 13.9 G/DL (ref 14–18)
LIPASE SERPL-CCNC: 64 U/L (ref 8–78)
MCH RBC QN AUTO: 29.9 PG (ref 27–31)
MCV RBC AUTO: 88.4 FL (ref 78–98)
MDIFF COMPLETE?: YES
PLATELET # BLD AUTO: 236 THOU/UL (ref 130–400)
POTASSIUM SERPL-SCNC: 4.2 MMOL/L (ref 3.5–5.1)
RBC # BLD AUTO: 4.66 MILL/UL (ref 4.7–6.1)
SODIUM SERPL-SCNC: 141 MMOL/L (ref 136–145)
WBC # BLD AUTO: 10.7 THOU/UL (ref 4.8–10.8)

## 2022-07-13 PROCEDURE — 70450 CT HEAD/BRAIN W/O DYE: CPT

## 2022-07-13 PROCEDURE — 36416 COLLJ CAPILLARY BLOOD SPEC: CPT

## 2022-07-13 PROCEDURE — 80053 COMPREHEN METABOLIC PANEL: CPT

## 2022-07-13 PROCEDURE — 74177 CT ABD & PELVIS W/CONTRAST: CPT

## 2022-07-13 PROCEDURE — 84484 ASSAY OF TROPONIN QUANT: CPT

## 2022-07-13 PROCEDURE — 85025 COMPLETE CBC W/AUTO DIFF WBC: CPT

## 2022-07-13 PROCEDURE — 93005 ELECTROCARDIOGRAM TRACING: CPT

## 2022-07-13 PROCEDURE — 85379 FIBRIN DEGRADATION QUANT: CPT

## 2022-07-13 PROCEDURE — 71045 X-RAY EXAM CHEST 1 VIEW: CPT

## 2022-07-13 PROCEDURE — 83690 ASSAY OF LIPASE: CPT

## 2022-07-13 PROCEDURE — 96375 TX/PRO/DX INJ NEW DRUG ADDON: CPT

## 2022-07-13 PROCEDURE — 71275 CT ANGIOGRAPHY CHEST: CPT

## 2022-07-13 PROCEDURE — 96376 TX/PRO/DX INJ SAME DRUG ADON: CPT

## 2022-07-13 PROCEDURE — 94760 N-INVAS EAR/PLS OXIMETRY 1: CPT

## 2022-07-13 PROCEDURE — 83880 ASSAY OF NATRIURETIC PEPTIDE: CPT

## 2022-07-13 PROCEDURE — 96374 THER/PROPH/DIAG INJ IV PUSH: CPT

## 2023-05-04 ENCOUNTER — HOSPITAL ENCOUNTER (OUTPATIENT)
Dept: HOSPITAL 92 - 2SW | Age: 68
Setting detail: OBSERVATION
LOS: 1 days | Discharge: HOME | End: 2023-05-05
Attending: FAMILY MEDICINE | Admitting: FAMILY MEDICINE
Payer: OTHER GOVERNMENT

## 2023-05-04 ENCOUNTER — HOSPITAL ENCOUNTER (EMERGENCY)
Dept: HOSPITAL 9 - MADERS | Age: 68
Discharge: ANOTHER HOSPITAL | End: 2023-05-04
Payer: MEDICARE

## 2023-05-04 VITALS — BODY MASS INDEX: 31.2 KG/M2

## 2023-05-04 DIAGNOSIS — Z79.4: ICD-10-CM

## 2023-05-04 DIAGNOSIS — F17.210: ICD-10-CM

## 2023-05-04 DIAGNOSIS — I50.30: ICD-10-CM

## 2023-05-04 DIAGNOSIS — F41.9: Primary | ICD-10-CM

## 2023-05-04 DIAGNOSIS — Z79.899: ICD-10-CM

## 2023-05-04 DIAGNOSIS — E11.9: ICD-10-CM

## 2023-05-04 DIAGNOSIS — R60.0: ICD-10-CM

## 2023-05-04 DIAGNOSIS — I08.1: ICD-10-CM

## 2023-05-04 DIAGNOSIS — I11.0: ICD-10-CM

## 2023-05-04 DIAGNOSIS — R07.89: ICD-10-CM

## 2023-05-04 DIAGNOSIS — E78.00: ICD-10-CM

## 2023-05-04 DIAGNOSIS — Z88.5: ICD-10-CM

## 2023-05-04 DIAGNOSIS — J44.9: ICD-10-CM

## 2023-05-04 DIAGNOSIS — F43.9: ICD-10-CM

## 2023-05-04 DIAGNOSIS — I25.10: ICD-10-CM

## 2023-05-04 DIAGNOSIS — I50.9: ICD-10-CM

## 2023-05-04 DIAGNOSIS — E78.5: ICD-10-CM

## 2023-05-04 DIAGNOSIS — Z95.5: ICD-10-CM

## 2023-05-04 DIAGNOSIS — I44.0: ICD-10-CM

## 2023-05-04 DIAGNOSIS — Z88.6: ICD-10-CM

## 2023-05-04 DIAGNOSIS — Z91.030: ICD-10-CM

## 2023-05-04 DIAGNOSIS — I25.2: ICD-10-CM

## 2023-05-04 DIAGNOSIS — I25.110: ICD-10-CM

## 2023-05-04 DIAGNOSIS — Z88.0: ICD-10-CM

## 2023-05-04 DIAGNOSIS — Z79.82: ICD-10-CM

## 2023-05-04 DIAGNOSIS — R80.9: ICD-10-CM

## 2023-05-04 DIAGNOSIS — R07.2: Primary | ICD-10-CM

## 2023-05-04 LAB
ALBUMIN SERPL BCG-MCNC: 3.8 G/DL (ref 3.4–4.8)
ALP SERPL-CCNC: 134 U/L (ref 40–110)
ALT SERPL W P-5'-P-CCNC: 17 U/L (ref 8–55)
ANION GAP SERPL CALC-SCNC: 15 MMOL/L (ref 10–20)
AST SERPL-CCNC: 11 U/L (ref 5–34)
BACTERIA UR QL AUTO: (no result) HPF
BASOPHILS # BLD AUTO: 0.2 THOU/UL (ref 0–0.2)
BASOPHILS NFR BLD AUTO: 1.4 % (ref 0–1)
BILIRUB SERPL-MCNC: 0.3 MG/DL (ref 0.2–1.2)
BUN SERPL-MCNC: 18 MG/DL (ref 8.4–25.7)
CALCIUM SERPL-MCNC: 9.4 MG/DL (ref 7.8–10.44)
CHLORIDE SERPL-SCNC: 107 MMOL/L (ref 98–107)
CO2 SERPL-SCNC: 22 MMOL/L (ref 23–31)
CREAT CL PREDICTED SERPL C-G-VRATE: 0 ML/MIN (ref 70–130)
EOSINOPHIL # BLD AUTO: 0.2 THOU/UL (ref 0–0.7)
EOSINOPHIL NFR BLD AUTO: 1.5 % (ref 0–10)
GLOBULIN SER CALC-MCNC: 3 G/DL (ref 2.4–3.5)
GLUCOSE SERPL-MCNC: 170 MG/DL (ref 80–115)
HGB BLD-MCNC: 13.7 G/DL (ref 14–18)
LIPASE SERPL-CCNC: 19 U/L (ref 8–78)
LYMPHOCYTES # BLD AUTO: 3.1 THOU/UL (ref 1.2–3.4)
LYMPHOCYTES NFR BLD AUTO: 28.1 % (ref 21–51)
MCH RBC QN AUTO: 31.3 PG (ref 27–31)
MCV RBC AUTO: 91.6 FL (ref 78–98)
MONOCYTES # BLD AUTO: 0.7 THOU/UL (ref 0.11–0.59)
MONOCYTES NFR BLD AUTO: 6.8 % (ref 0–10)
NEUTROPHILS # BLD AUTO: 6.8 THOU/UL (ref 1.4–6.5)
NEUTROPHILS NFR BLD AUTO: 62.2 % (ref 42–75)
PLATELET # BLD AUTO: 226 10X3/UL (ref 130–400)
POTASSIUM SERPL-SCNC: 3.7 MMOL/L (ref 3.5–5.1)
PROT UR STRIP.AUTO-MCNC: (no result) MG/DL
RBC # BLD AUTO: 4.39 MILL/UL (ref 4.7–6.1)
RBC UR QL AUTO: (no result) HPF (ref 0–3)
SODIUM SERPL-SCNC: 140 MMOL/L (ref 136–145)
SP GR UR STRIP: 1.02 (ref 1–1.03)
WBC # BLD AUTO: 10.9 10X3/UL (ref 4.8–10.8)
WBC UR QL AUTO: (no result) HPF (ref 0–3)

## 2023-05-04 PROCEDURE — 93005 ELECTROCARDIOGRAM TRACING: CPT

## 2023-05-04 PROCEDURE — 85379 FIBRIN DEGRADATION QUANT: CPT

## 2023-05-04 PROCEDURE — 36415 COLL VENOUS BLD VENIPUNCTURE: CPT

## 2023-05-04 PROCEDURE — 83880 ASSAY OF NATRIURETIC PEPTIDE: CPT

## 2023-05-04 PROCEDURE — 71046 X-RAY EXAM CHEST 2 VIEWS: CPT

## 2023-05-04 PROCEDURE — G0378 HOSPITAL OBSERVATION PER HR: HCPCS

## 2023-05-04 PROCEDURE — 82570 ASSAY OF URINE CREATININE: CPT

## 2023-05-04 PROCEDURE — 83690 ASSAY OF LIPASE: CPT

## 2023-05-04 PROCEDURE — 96375 TX/PRO/DX INJ NEW DRUG ADDON: CPT

## 2023-05-04 PROCEDURE — 36416 COLLJ CAPILLARY BLOOD SPEC: CPT

## 2023-05-04 PROCEDURE — 96374 THER/PROPH/DIAG INJ IV PUSH: CPT

## 2023-05-04 PROCEDURE — 83036 HEMOGLOBIN GLYCOSYLATED A1C: CPT

## 2023-05-04 PROCEDURE — 80053 COMPREHEN METABOLIC PANEL: CPT

## 2023-05-04 PROCEDURE — 83735 ASSAY OF MAGNESIUM: CPT

## 2023-05-04 PROCEDURE — 81015 MICROSCOPIC EXAM OF URINE: CPT

## 2023-05-04 PROCEDURE — 85025 COMPLETE CBC W/AUTO DIFF WBC: CPT

## 2023-05-04 PROCEDURE — 84443 ASSAY THYROID STIM HORMONE: CPT

## 2023-05-04 PROCEDURE — 84100 ASSAY OF PHOSPHORUS: CPT

## 2023-05-04 PROCEDURE — 84484 ASSAY OF TROPONIN QUANT: CPT

## 2023-05-04 PROCEDURE — 80061 LIPID PANEL: CPT

## 2023-05-04 PROCEDURE — 81003 URINALYSIS AUTO W/O SCOPE: CPT

## 2023-05-04 PROCEDURE — 84156 ASSAY OF PROTEIN URINE: CPT

## 2023-05-04 PROCEDURE — 94760 N-INVAS EAR/PLS OXIMETRY 1: CPT

## 2023-05-04 PROCEDURE — 93306 TTE W/DOPPLER COMPLETE: CPT

## 2023-05-05 VITALS — SYSTOLIC BLOOD PRESSURE: 119 MMHG | DIASTOLIC BLOOD PRESSURE: 65 MMHG | TEMPERATURE: 98.2 F

## 2023-05-05 LAB
ALBUMIN SERPL BCG-MCNC: 3.7 G/DL (ref 3.4–4.8)
ALP SERPL-CCNC: 120 U/L (ref 40–110)
ALT SERPL W P-5'-P-CCNC: 15 U/L (ref 8–55)
ANION GAP SERPL CALC-SCNC: 14 MMOL/L (ref 10–20)
AST SERPL-CCNC: 15 U/L (ref 5–34)
BASOPHILS # BLD AUTO: 0.1 THOU/UL (ref 0–0.2)
BASOPHILS NFR BLD AUTO: 0.7 % (ref 0–1)
BILIRUB SERPL-MCNC: 0.6 MG/DL (ref 0.2–1.2)
BUN SERPL-MCNC: 17 MG/DL (ref 8.4–25.7)
CALCIUM SERPL-MCNC: 8.7 MG/DL (ref 7.8–10.44)
CHD RISK SERPL-RTO: 5 (ref ?–4.5)
CHLORIDE SERPL-SCNC: 109 MMOL/L (ref 98–107)
CHOLEST SERPL-MCNC: 189 MG/DL
CO2 SERPL-SCNC: 21 MMOL/L (ref 23–31)
CREAT CL PREDICTED SERPL C-G-VRATE: 112 ML/MIN (ref 70–130)
EOSINOPHIL # BLD AUTO: 0.2 THOU/UL (ref 0–0.7)
EOSINOPHIL NFR BLD AUTO: 1.5 % (ref 0–10)
GLOBULIN SER CALC-MCNC: 2.8 G/DL (ref 2.4–3.5)
GLUCOSE SERPL-MCNC: 130 MG/DL (ref 80–115)
HDLC SERPL-MCNC: 38 MG/DL
HGB BLD-MCNC: 14.6 G/DL (ref 14–18)
LDLC SERPL CALC-MCNC: 120 MG/DL
LYMPHOCYTES # BLD: 3.2 THOU/UL (ref 1.2–3.4)
LYMPHOCYTES NFR BLD AUTO: 31 % (ref 21–51)
MAGNESIUM SERPL-MCNC: 2 MG/DL (ref 1.6–2.6)
MCH RBC QN AUTO: 33 PG (ref 27–31)
MCV RBC AUTO: 95.7 FL (ref 78–98)
MONOCYTES # BLD AUTO: 0.9 THOU/UL (ref 0.11–0.59)
MONOCYTES NFR BLD AUTO: 8.8 % (ref 0–10)
NEUTROPHILS # BLD AUTO: 5.9 THOU/UL (ref 1.4–6.5)
NEUTROPHILS NFR BLD AUTO: 58 % (ref 42–75)
PLATELET # BLD AUTO: 202 10X3/UL (ref 130–400)
POTASSIUM SERPL-SCNC: 3.7 MMOL/L (ref 3.5–5.1)
PROT UR-MCNC: 113 MG/DL (ref 1–14)
RBC # BLD AUTO: 4.43 MILL/UL (ref 4.7–6.1)
SODIUM SERPL-SCNC: 140 MMOL/L (ref 136–145)
TRIGL SERPL-MCNC: 153 MG/DL (ref ?–150)
TROPONIN I SERPL DL<=0.01 NG/ML-MCNC: 0.02 NG/ML (ref ?–0.03)
WBC # BLD AUTO: 10.2 10X3/UL (ref 4.8–10.8)

## 2023-08-17 ENCOUNTER — HOSPITAL ENCOUNTER (INPATIENT)
Dept: HOSPITAL 92 - T4-B | Age: 68
LOS: 4 days | Discharge: HOME | DRG: 125 | End: 2023-08-21
Attending: INTERNAL MEDICINE | Admitting: INTERNAL MEDICINE
Payer: OTHER GOVERNMENT

## 2023-08-17 VITALS — BODY MASS INDEX: 32.5 KG/M2

## 2023-08-17 DIAGNOSIS — J44.1: ICD-10-CM

## 2023-08-17 DIAGNOSIS — I50.9: ICD-10-CM

## 2023-08-17 DIAGNOSIS — E78.5: ICD-10-CM

## 2023-08-17 DIAGNOSIS — Z88.5: ICD-10-CM

## 2023-08-17 DIAGNOSIS — E11.40: ICD-10-CM

## 2023-08-17 DIAGNOSIS — Z79.4: ICD-10-CM

## 2023-08-17 DIAGNOSIS — B34.9: ICD-10-CM

## 2023-08-17 DIAGNOSIS — E11.22: ICD-10-CM

## 2023-08-17 DIAGNOSIS — I25.10: ICD-10-CM

## 2023-08-17 DIAGNOSIS — I13.0: ICD-10-CM

## 2023-08-17 DIAGNOSIS — N18.9: ICD-10-CM

## 2023-08-17 DIAGNOSIS — Z79.82: ICD-10-CM

## 2023-08-17 DIAGNOSIS — Z90.49: ICD-10-CM

## 2023-08-17 DIAGNOSIS — D35.2: ICD-10-CM

## 2023-08-17 DIAGNOSIS — H34.8112: ICD-10-CM

## 2023-08-17 DIAGNOSIS — I25.2: ICD-10-CM

## 2023-08-17 DIAGNOSIS — Z88.8: ICD-10-CM

## 2023-08-17 DIAGNOSIS — Z88.1: ICD-10-CM

## 2023-08-17 DIAGNOSIS — E87.20: ICD-10-CM

## 2023-08-17 DIAGNOSIS — H35.61: Primary | ICD-10-CM

## 2023-08-17 DIAGNOSIS — Z98.890: ICD-10-CM

## 2023-08-17 DIAGNOSIS — D72.829: ICD-10-CM

## 2023-08-17 DIAGNOSIS — Z88.0: ICD-10-CM

## 2023-08-17 DIAGNOSIS — E11.319: ICD-10-CM

## 2023-08-17 DIAGNOSIS — Z95.5: ICD-10-CM

## 2023-08-17 DIAGNOSIS — F17.210: ICD-10-CM

## 2023-08-17 PROCEDURE — 70551 MRI BRAIN STEM W/O DYE: CPT

## 2023-08-17 PROCEDURE — G0378 HOSPITAL OBSERVATION PER HR: HCPCS

## 2023-08-17 PROCEDURE — 96372 THER/PROPH/DIAG INJ SC/IM: CPT

## 2023-08-17 PROCEDURE — 80048 BASIC METABOLIC PNL TOTAL CA: CPT

## 2023-08-17 PROCEDURE — 86780 TREPONEMA PALLIDUM: CPT

## 2023-08-17 PROCEDURE — 86038 ANTINUCLEAR ANTIBODIES: CPT

## 2023-08-17 PROCEDURE — 96375 TX/PRO/DX INJ NEW DRUG ADDON: CPT

## 2023-08-17 PROCEDURE — 85652 RBC SED RATE AUTOMATED: CPT

## 2023-08-17 PROCEDURE — 80061 LIPID PANEL: CPT

## 2023-08-17 PROCEDURE — 70543 MRI ORBT/FAC/NCK W/O &W/DYE: CPT

## 2023-08-17 PROCEDURE — 82164 ANGIOTENSIN I ENZYME TEST: CPT

## 2023-08-17 PROCEDURE — 84146 ASSAY OF PROLACTIN: CPT

## 2023-08-17 PROCEDURE — 96365 THER/PROPH/DIAG IV INF INIT: CPT

## 2023-08-17 PROCEDURE — 93005 ELECTROCARDIOGRAM TRACING: CPT

## 2023-08-17 PROCEDURE — 84443 ASSAY THYROID STIM HORMONE: CPT

## 2023-08-17 PROCEDURE — 86225 DNA ANTIBODY NATIVE: CPT

## 2023-08-17 PROCEDURE — 85025 COMPLETE CBC W/AUTO DIFF WBC: CPT

## 2023-08-17 PROCEDURE — 84145 PROCALCITONIN (PCT): CPT

## 2023-08-17 PROCEDURE — 82728 ASSAY OF FERRITIN: CPT

## 2023-08-17 PROCEDURE — 96376 TX/PRO/DX INJ SAME DRUG ADON: CPT

## 2023-08-17 PROCEDURE — 36415 COLL VENOUS BLD VENIPUNCTURE: CPT

## 2023-08-17 PROCEDURE — 81001 URINALYSIS AUTO W/SCOPE: CPT

## 2023-08-17 PROCEDURE — 83036 HEMOGLOBIN GLYCOSYLATED A1C: CPT

## 2023-08-17 PROCEDURE — 36416 COLLJ CAPILLARY BLOOD SPEC: CPT

## 2023-08-17 PROCEDURE — 85379 FIBRIN DEGRADATION QUANT: CPT

## 2023-08-17 PROCEDURE — 93010 ELECTROCARDIOGRAM REPORT: CPT

## 2023-08-17 PROCEDURE — 84439 ASSAY OF FREE THYROXINE: CPT

## 2023-08-17 PROCEDURE — 87798 DETECT AGENT NOS DNA AMP: CPT

## 2023-08-17 PROCEDURE — 96366 THER/PROPH/DIAG IV INF ADDON: CPT

## 2023-08-17 PROCEDURE — 87529 HSV DNA AMP PROBE: CPT

## 2023-08-17 RX ADMIN — ONDANSETRON PRN MG: 2 INJECTION INTRAMUSCULAR; INTRAVENOUS at 18:25

## 2023-08-17 RX ADMIN — AZITHROMYCIN SCH MLS: 500 INJECTION, POWDER, LYOPHILIZED, FOR SOLUTION INTRAVENOUS at 21:30

## 2023-08-17 RX ADMIN — INSULIN GLARGINE SCH: 100 INJECTION, SOLUTION SUBCUTANEOUS at 20:40

## 2023-08-18 LAB
ANION GAP SERPL CALC-SCNC: 13 MMOL/L (ref 10–20)
BASOPHILS # BLD AUTO: 0 THOU/UL (ref 0–0.2)
BASOPHILS NFR BLD AUTO: 0.2 % (ref 0–1)
BUN SERPL-MCNC: 22 MG/DL (ref 8.4–25.7)
CALCIUM SERPL-MCNC: 8.6 MG/DL (ref 7.8–10.44)
CAUTI INDICATIONS FOR CULTURE: (no result)
CHD RISK SERPL-RTO: 4.5 (ref ?–4.5)
CHLORIDE SERPL-SCNC: 105 MMOL/L (ref 98–107)
CHOLEST SERPL-MCNC: 187 MG/DL
CO2 SERPL-SCNC: 20 MMOL/L (ref 23–31)
CREAT CL PREDICTED SERPL C-G-VRATE: 110 ML/MIN (ref 70–130)
EOSINOPHIL # BLD AUTO: 0 THOU/UL (ref 0–0.7)
EOSINOPHIL NFR BLD AUTO: 0 % (ref 0–10)
GLUCOSE SERPL-MCNC: 297 MG/DL (ref 80–115)
HCT VFR BLD CALC: 37.9 % (ref 42–52)
HDLC SERPL-MCNC: 42 MG/DL
HGB BLD-MCNC: 13.2 G/DL (ref 14–18)
LDLC SERPL CALC-MCNC: 132 MG/DL
LYMPHOCYTES NFR BLD AUTO: 10.1 % (ref 21–51)
MCH RBC QN AUTO: 31.5 PG (ref 27–31)
MCV RBC AUTO: 90.5 FL (ref 78–98)
MONOCYTES # BLD AUTO: 0.1 THOU/UL (ref 0.11–0.59)
MONOCYTES NFR BLD AUTO: 0.8 % (ref 0–10)
NEUTROPHILS # BLD AUTO: 12 THOU/UL (ref 1.4–6.5)
NEUTROPHILS NFR BLD AUTO: 88.5 % (ref 42–75)
PLATELET # BLD AUTO: 181 10X3/UL (ref 130–400)
POTASSIUM SERPL-SCNC: 4.2 MMOL/L (ref 3.5–5.1)
PROT UR STRIP.AUTO-MCNC: 200 MG/DL
RBC # BLD AUTO: 4.19 MILL/UL (ref 4.7–6.1)
RBC UR QL AUTO: (no result) HPF (ref 0–3)
SODIUM SERPL-SCNC: 134 MMOL/L (ref 136–145)
SP GR UR STRIP: 1.04 (ref 1–1.04)
TRIGL SERPL-MCNC: 67 MG/DL (ref ?–150)
WBC # BLD AUTO: 13.5 10X3/UL (ref 4.8–10.8)
WBC UR QL AUTO: (no result) HPF (ref 0–3)

## 2023-08-18 RX ADMIN — ASPIRIN SCH MG: 81 TABLET ORAL at 08:12

## 2023-08-18 RX ADMIN — INSULIN LISPRO PRN UNIT: 100 INJECTION, SOLUTION INTRAVENOUS; SUBCUTANEOUS at 12:22

## 2023-08-18 RX ADMIN — INSULIN GLARGINE SCH UNITS: 100 INJECTION, SOLUTION SUBCUTANEOUS at 08:12

## 2023-08-18 RX ADMIN — AZITHROMYCIN SCH MLS: 500 INJECTION, POWDER, LYOPHILIZED, FOR SOLUTION INTRAVENOUS at 20:44

## 2023-08-18 RX ADMIN — INSULIN GLARGINE SCH UNITS: 100 INJECTION, SOLUTION SUBCUTANEOUS at 21:06

## 2023-08-19 LAB
ANA SYMPHONY (QUANTITATIVE): 0.3 RATIO
DSDNA IGG SERPL IA-ACNC: 1 IU/ML
DSDNA INTERPRETATION: (no result)
SYPHILIS ANTIBODY INDEX: 0.02 S/CO

## 2023-08-19 RX ADMIN — ONDANSETRON PRN MG: 2 INJECTION INTRAMUSCULAR; INTRAVENOUS at 21:42

## 2023-08-19 RX ADMIN — HYDROCODONE BITARTRATE AND ACETAMINOPHEN PRN TAB: 5; 325 TABLET ORAL at 09:31

## 2023-08-19 RX ADMIN — INSULIN GLARGINE SCH UNITS: 100 INJECTION, SOLUTION SUBCUTANEOUS at 09:26

## 2023-08-19 RX ADMIN — INSULIN LISPRO PRN UNIT: 100 INJECTION, SOLUTION INTRAVENOUS; SUBCUTANEOUS at 17:22

## 2023-08-19 RX ADMIN — INSULIN GLARGINE SCH UNITS: 100 INJECTION, SOLUTION SUBCUTANEOUS at 21:17

## 2023-08-19 RX ADMIN — INSULIN LISPRO PRN UNIT: 100 INJECTION, SOLUTION INTRAVENOUS; SUBCUTANEOUS at 12:39

## 2023-08-19 RX ADMIN — AZITHROMYCIN SCH MLS: 500 INJECTION, POWDER, LYOPHILIZED, FOR SOLUTION INTRAVENOUS at 21:06

## 2023-08-19 RX ADMIN — NIFEDIPINE SCH: 30 TABLET, FILM COATED, EXTENDED RELEASE ORAL at 21:15

## 2023-08-19 RX ADMIN — ASPIRIN SCH MG: 81 TABLET ORAL at 09:24

## 2023-08-19 RX ADMIN — INSULIN LISPRO PRN UNIT: 100 INJECTION, SOLUTION INTRAVENOUS; SUBCUTANEOUS at 06:00

## 2023-08-20 LAB
ANION GAP SERPL CALC-SCNC: 12 MMOL/L (ref 10–20)
BASOPHILS # BLD AUTO: 0 THOU/UL (ref 0–0.2)
BASOPHILS NFR BLD AUTO: 0.1 % (ref 0–1)
BUN SERPL-MCNC: 37 MG/DL (ref 8.4–25.7)
CALCIUM SERPL-MCNC: 8.3 MG/DL (ref 7.8–10.44)
CHLORIDE SERPL-SCNC: 108 MMOL/L (ref 98–107)
CO2 SERPL-SCNC: 21 MMOL/L (ref 23–31)
CREAT CL PREDICTED SERPL C-G-VRATE: 126 ML/MIN (ref 70–130)
EOSINOPHIL # BLD AUTO: 0 THOU/UL (ref 0–0.7)
EOSINOPHIL NFR BLD AUTO: 0 % (ref 0–10)
GLUCOSE SERPL-MCNC: 197 MG/DL (ref 80–115)
HCT VFR BLD CALC: 36.9 % (ref 42–52)
HGB BLD-MCNC: 13.1 G/DL (ref 14–18)
LYMPHOCYTES NFR BLD AUTO: 7.2 % (ref 21–51)
MCH RBC QN AUTO: 31.8 PG (ref 27–31)
MCV RBC AUTO: 89.6 FL (ref 78–98)
MONOCYTES # BLD AUTO: 0.7 THOU/UL (ref 0.11–0.59)
MONOCYTES NFR BLD AUTO: 3.7 % (ref 0–10)
NEUTROPHILS # BLD AUTO: 17.5 THOU/UL (ref 1.4–6.5)
NEUTROPHILS NFR BLD AUTO: 88.6 % (ref 42–75)
PLATELET # BLD AUTO: 246 10X3/UL (ref 130–400)
POTASSIUM SERPL-SCNC: 4 MMOL/L (ref 3.5–5.1)
RBC # BLD AUTO: 4.12 MILL/UL (ref 4.7–6.1)
SODIUM SERPL-SCNC: 137 MMOL/L (ref 136–145)
WBC # BLD AUTO: 19.7 10X3/UL (ref 4.8–10.8)

## 2023-08-20 RX ADMIN — ONDANSETRON PRN MG: 2 INJECTION INTRAMUSCULAR; INTRAVENOUS at 20:14

## 2023-08-20 RX ADMIN — HYDROCODONE BITARTRATE AND ACETAMINOPHEN PRN TAB: 5; 325 TABLET ORAL at 10:36

## 2023-08-20 RX ADMIN — SACUBITRIL AND VALSARTAN SCH TAB: 49; 51 TABLET, FILM COATED ORAL at 08:17

## 2023-08-20 RX ADMIN — INSULIN LISPRO PRN UNIT: 100 INJECTION, SOLUTION INTRAVENOUS; SUBCUTANEOUS at 05:36

## 2023-08-20 RX ADMIN — AZITHROMYCIN SCH MLS: 500 INJECTION, POWDER, LYOPHILIZED, FOR SOLUTION INTRAVENOUS at 19:44

## 2023-08-20 RX ADMIN — NIFEDIPINE SCH MG: 30 TABLET, FILM COATED, EXTENDED RELEASE ORAL at 19:41

## 2023-08-20 RX ADMIN — INSULIN GLARGINE SCH UNITS: 100 INJECTION, SOLUTION SUBCUTANEOUS at 08:23

## 2023-08-20 RX ADMIN — INSULIN LISPRO PRN UNIT: 100 INJECTION, SOLUTION INTRAVENOUS; SUBCUTANEOUS at 16:40

## 2023-08-20 RX ADMIN — NIFEDIPINE SCH MG: 30 TABLET, FILM COATED, EXTENDED RELEASE ORAL at 08:17

## 2023-08-20 RX ADMIN — INSULIN LISPRO PRN UNIT: 100 INJECTION, SOLUTION INTRAVENOUS; SUBCUTANEOUS at 12:24

## 2023-08-20 RX ADMIN — INSULIN GLARGINE SCH UNITS: 100 INJECTION, SOLUTION SUBCUTANEOUS at 21:50

## 2023-08-21 VITALS — TEMPERATURE: 97.8 F | DIASTOLIC BLOOD PRESSURE: 66 MMHG | SYSTOLIC BLOOD PRESSURE: 156 MMHG

## 2023-08-21 LAB
ANION GAP SERPL CALC-SCNC: 10 MMOL/L (ref 10–20)
BASOPHILS # BLD AUTO: 0 THOU/UL (ref 0–0.2)
BASOPHILS NFR BLD AUTO: 0.2 % (ref 0–1)
BUN SERPL-MCNC: 43 MG/DL (ref 8.4–25.7)
CALCIUM SERPL-MCNC: 8.4 MG/DL (ref 7.8–10.44)
CHLORIDE SERPL-SCNC: 111 MMOL/L (ref 98–107)
CO2 SERPL-SCNC: 24 MMOL/L (ref 23–31)
CREAT CL PREDICTED SERPL C-G-VRATE: 123 ML/MIN (ref 70–130)
EOSINOPHIL # BLD AUTO: 0 THOU/UL (ref 0–0.7)
EOSINOPHIL NFR BLD AUTO: 0 % (ref 0–10)
GLUCOSE SERPL-MCNC: 75 MG/DL (ref 80–115)
HCT VFR BLD CALC: 38.9 % (ref 42–52)
HGB BLD-MCNC: 13.3 G/DL (ref 14–18)
LYMPHOCYTES NFR BLD AUTO: 10.5 % (ref 21–51)
MCH RBC QN AUTO: 31 PG (ref 27–31)
MCV RBC AUTO: 90.7 FL (ref 78–98)
MONOCYTES # BLD AUTO: 1.4 THOU/UL (ref 0.11–0.59)
MONOCYTES NFR BLD AUTO: 7.8 % (ref 0–10)
NEUTROPHILS # BLD AUTO: 14.4 THOU/UL (ref 1.4–6.5)
NEUTROPHILS NFR BLD AUTO: 81 % (ref 42–75)
PLATELET # BLD AUTO: 260 10X3/UL (ref 130–400)
POTASSIUM SERPL-SCNC: 3.6 MMOL/L (ref 3.5–5.1)
RBC # BLD AUTO: 4.29 MILL/UL (ref 4.7–6.1)
SODIUM SERPL-SCNC: 141 MMOL/L (ref 136–145)
WBC # BLD AUTO: 17.7 10X3/UL (ref 4.8–10.8)

## 2023-08-21 RX ADMIN — SACUBITRIL AND VALSARTAN SCH TAB: 49; 51 TABLET, FILM COATED ORAL at 09:15

## 2023-08-21 RX ADMIN — INSULIN GLARGINE SCH UNITS: 100 INJECTION, SOLUTION SUBCUTANEOUS at 09:16

## 2023-08-31 ENCOUNTER — HOSPITAL ENCOUNTER (OUTPATIENT)
Dept: HOSPITAL 92 - MRI | Age: 68
Discharge: HOME | End: 2023-08-31
Attending: NEUROLOGICAL SURGERY
Payer: MEDICARE

## 2023-08-31 DIAGNOSIS — D35.2: Primary | ICD-10-CM

## 2023-08-31 DIAGNOSIS — G93.9: ICD-10-CM

## 2023-08-31 DIAGNOSIS — H53.8: ICD-10-CM

## 2023-08-31 PROCEDURE — 70553 MRI BRAIN STEM W/O & W/DYE: CPT

## 2024-05-24 ENCOUNTER — HOSPITAL ENCOUNTER (OUTPATIENT)
Dept: HOSPITAL 92 - CSHULT | Age: 69
Discharge: HOME | End: 2024-05-24
Payer: MEDICARE

## 2024-05-24 DIAGNOSIS — R93.1: ICD-10-CM

## 2024-05-24 DIAGNOSIS — R01.1: Primary | ICD-10-CM

## 2024-05-24 PROCEDURE — 93306 TTE W/DOPPLER COMPLETE: CPT
